# Patient Record
Sex: MALE | Race: OTHER | Employment: OTHER | URBAN - METROPOLITAN AREA
[De-identification: names, ages, dates, MRNs, and addresses within clinical notes are randomized per-mention and may not be internally consistent; named-entity substitution may affect disease eponyms.]

---

## 2017-12-15 NOTE — PATIENT DISCUSSION
Patient educated on neuroadaptation, sandoval/va, near focal point, Increase near lighting, and Symfony near goal OS.

## 2018-03-01 NOTE — PATIENT DISCUSSION
Reassured good distance and reading.  Patient needs to adapt more to Symfony at near and needs yag laser after Tax season.

## 2018-03-15 ENCOUNTER — ESTABLISHED PATIENT (OUTPATIENT)
Dept: URBAN - METROPOLITAN AREA CLINIC 36 | Facility: CLINIC | Age: 58
End: 2018-03-15

## 2018-03-15 DIAGNOSIS — H40.033: ICD-10-CM

## 2018-03-15 DIAGNOSIS — H25.813: ICD-10-CM

## 2018-03-15 PROCEDURE — 92014 COMPRE OPH EXAM EST PT 1/>: CPT

## 2018-03-15 ASSESSMENT — VISUAL ACUITY
OS_SC: 20/70-1
OD_PH: 20/50
OS_PH: 20/50-2
OD_SC: 20/70

## 2018-03-15 ASSESSMENT — TONOMETRY
OS_IOP_MMHG: 14
OD_IOP_MMHG: 16

## 2018-03-28 ENCOUNTER — ESTABLISHED PATIENT (OUTPATIENT)
Dept: URBAN - METROPOLITAN AREA CLINIC 36 | Facility: CLINIC | Age: 58
End: 2018-03-28

## 2018-03-28 DIAGNOSIS — H52.7: ICD-10-CM

## 2018-03-28 DIAGNOSIS — H53.8: ICD-10-CM

## 2018-03-28 PROCEDURE — 92015 DETERMINE REFRACTIVE STATE: CPT

## 2018-03-28 ASSESSMENT — VISUAL ACUITY
OS_CC: 20/30-2
OD_CC: J2
OD_CC: 20/25
OS_CC: J1

## 2018-03-30 ENCOUNTER — CONSULT (OUTPATIENT)
Dept: URBAN - METROPOLITAN AREA CLINIC 36 | Facility: CLINIC | Age: 58
End: 2018-03-30

## 2018-03-30 VITALS — DIASTOLIC BLOOD PRESSURE: 79 MMHG | HEART RATE: 81 BPM | HEIGHT: 60 IN | SYSTOLIC BLOOD PRESSURE: 120 MMHG

## 2018-03-30 DIAGNOSIS — H04.123: ICD-10-CM

## 2018-03-30 DIAGNOSIS — H25.813: ICD-10-CM

## 2018-03-30 DIAGNOSIS — H40.023: ICD-10-CM

## 2018-03-30 DIAGNOSIS — H40.033: ICD-10-CM

## 2018-03-30 PROCEDURE — 92020 GONIOSCOPY: CPT

## 2018-03-30 PROCEDURE — 76514 ECHO EXAM OF EYE THICKNESS: CPT

## 2018-03-30 PROCEDURE — 92014 COMPRE OPH EXAM EST PT 1/>: CPT

## 2018-03-30 PROCEDURE — 92132 CPTRZD OPH DX IMG ANT SGM: CPT

## 2018-03-30 RX ORDER — PREDNISOLONE ACETATE 10 MG/ML: 1 SUSPENSION/ DROPS OPHTHALMIC

## 2018-03-30 ASSESSMENT — PACHYMETRY
OD_CT_UM: 542
OS_CT_UM: 544

## 2018-03-30 ASSESSMENT — VISUAL ACUITY
OD_SC: 20/60-2
OD_PH: 20/30
OS_SC: 20/50
OS_PH: 20/30

## 2018-04-23 ENCOUNTER — SURGERY/PROCEDURE (OUTPATIENT)
Dept: URBAN - METROPOLITAN AREA SURGERY 14 | Facility: SURGERY | Age: 58
End: 2018-04-23

## 2018-04-23 DIAGNOSIS — H40.031: ICD-10-CM

## 2018-04-23 PROCEDURE — 66761 REVISION OF IRIS: CPT

## 2018-05-21 ENCOUNTER — SURGERY/PROCEDURE (OUTPATIENT)
Dept: URBAN - METROPOLITAN AREA SURGERY 14 | Facility: SURGERY | Age: 58
End: 2018-05-21

## 2018-05-21 DIAGNOSIS — H40.032: ICD-10-CM

## 2018-05-21 PROCEDURE — 66761 REVISION OF IRIS: CPT

## 2018-06-15 ENCOUNTER — DILATED FUNDUS EXAM (OUTPATIENT)
Dept: URBAN - METROPOLITAN AREA CLINIC 36 | Facility: CLINIC | Age: 58
End: 2018-06-15

## 2018-06-15 DIAGNOSIS — H40.023: ICD-10-CM

## 2018-06-15 DIAGNOSIS — H40.033: ICD-10-CM

## 2018-06-15 PROCEDURE — 92132 CPTRZD OPH DX IMG ANT SGM: CPT

## 2018-06-15 PROCEDURE — 92012 INTRM OPH EXAM EST PATIENT: CPT

## 2018-06-15 PROCEDURE — 9222550 BILAT EXTENDED OPHTHALMOSCOPY, FIRST

## 2018-06-15 PROCEDURE — 92020 GONIOSCOPY: CPT

## 2018-06-15 PROCEDURE — 92250 FUNDUS PHOTOGRAPHY W/I&R: CPT

## 2018-06-15 ASSESSMENT — VISUAL ACUITY
OS_SC: 20/50-1
OS_PH: 20/25-2
OD_PH: 20/25
OD_SC: 20/50-1

## 2018-06-15 ASSESSMENT — TONOMETRY
OD_IOP_MMHG: 14
OS_IOP_MMHG: 14

## 2018-07-11 NOTE — PROCEDURE NOTE: SURGICAL
Prior to commencing surgery patient identification, surgical procedure, site, and side were confirmed by Dr. Misha Gallagher. Following topical proparacaine anesthesia, the patient was positioned at the YAG laser, a contact lens coupled to the cornea with methylcellulose and an axial posterior capsulotomy performed without complication using 3.3 Mj x 29. Excess methylcellulose was washed from the eye, one drop of Alphagan was instilled and the patient returned to the holding area having tolerated the procedure well and without complication. <br />[id number]

## 2018-07-18 NOTE — PROCEDURE NOTE: SURGICAL
Prior to commencing surgery patient identification, surgical procedure, site, and side were confirmed by Dr. Thuy Delgado. Following topical proparacaine anesthesia, the patient was positioned at the YAG laser, a contact lens coupled to the cornea with methylcellulose and an axial posterior capsulotomy performed without complication using 3.0 Mj x 33. Excess methylcellulose was washed from the eye, one drop of Alphagan was instilled and the patient returned to the holding area having tolerated the procedure well and without complication. <br />[id number]

## 2018-12-14 ENCOUNTER — IOP CHECK (OUTPATIENT)
Dept: URBAN - METROPOLITAN AREA CLINIC 36 | Facility: CLINIC | Age: 58
End: 2018-12-14

## 2018-12-14 DIAGNOSIS — H40.033: ICD-10-CM

## 2018-12-14 DIAGNOSIS — H40.1131: ICD-10-CM

## 2018-12-14 PROCEDURE — 92133 CPTRZD OPH DX IMG PST SGM ON: CPT

## 2018-12-14 PROCEDURE — 92012 INTRM OPH EXAM EST PATIENT: CPT

## 2018-12-14 RX ORDER — TIMOLOL MALEATE 6.8 MG/ML: 1 SOLUTION OPHTHALMIC EVERY MORNING

## 2018-12-14 ASSESSMENT — TONOMETRY
OS_IOP_MMHG: 20
OD_IOP_MMHG: 22

## 2018-12-14 ASSESSMENT — VISUAL ACUITY
OS_SC: 20/20-1
OD_SC: 20/25

## 2019-01-11 ENCOUNTER — IOP CHECK (OUTPATIENT)
Dept: URBAN - METROPOLITAN AREA CLINIC 36 | Facility: CLINIC | Age: 59
End: 2019-01-11

## 2019-01-11 DIAGNOSIS — H40.033: ICD-10-CM

## 2019-01-11 DIAGNOSIS — H40.1131: ICD-10-CM

## 2019-01-11 PROCEDURE — 92012 INTRM OPH EXAM EST PATIENT: CPT

## 2019-01-11 PROCEDURE — 3284F IOP RED >=15% PRE-NTRV LVL: CPT

## 2019-01-11 PROCEDURE — 2027F OPTIC NERVE HEAD EVAL DONE: CPT

## 2019-01-11 PROCEDURE — G8427 DOCREV CUR MEDS BY ELIG CLIN: HCPCS

## 2019-01-11 PROCEDURE — 0517F GLAUCOMA PLAN OF CARE DOCD: CPT

## 2019-01-11 PROCEDURE — G9902 PT SCRN TBCO AND ID AS USER: HCPCS

## 2019-01-11 ASSESSMENT — TONOMETRY
OD_IOP_MMHG: 18
OS_IOP_MMHG: 16

## 2019-01-11 ASSESSMENT — VISUAL ACUITY
OD_CC: 20/25
OS_CC: 20/25-1

## 2019-02-12 NOTE — PATIENT DISCUSSION
Patient is happy with his vision at distance and near.  Patient states he doesn't need glasses for majority of daily activities except for small print while working.

## 2019-09-09 NOTE — PATIENT DISCUSSION
OD 1st. Sym OD: ben OS: TBD vs CV OD: ben OS: -1.75 to -2.00. Detail Level: Zone Detail Level: Detailed

## 2019-10-07 NOTE — PATIENT DISCUSSION
Called patient regarding Bumped appointment on 10/7 with Richelle. Requested call back to reschedule.        May need YAG Cap in future.

## 2023-09-28 ENCOUNTER — NEW PATIENT (OUTPATIENT)
Dept: URBAN - METROPOLITAN AREA CLINIC 36 | Facility: CLINIC | Age: 63
End: 2023-09-28

## 2023-09-28 DIAGNOSIS — H40.1131: ICD-10-CM

## 2023-09-28 DIAGNOSIS — H40.033: ICD-10-CM

## 2023-09-28 DIAGNOSIS — H25.813: ICD-10-CM

## 2023-09-28 DIAGNOSIS — H04.123: ICD-10-CM

## 2023-09-28 PROCEDURE — 92004 COMPRE OPH EXAM NEW PT 1/>: CPT

## 2023-09-28 PROCEDURE — 92015 DETERMINE REFRACTIVE STATE: CPT

## 2023-09-28 ASSESSMENT — VISUAL ACUITY
OS_CC: 20/20-2
OD_SC: J3
OD_CC: 20/20-1
OD_SC: 20/60-2
OS_SC: 20/60-1
OS_SC: J2

## 2023-09-28 ASSESSMENT — TONOMETRY
OS_IOP_MMHG: 17
OD_IOP_MMHG: 16

## 2024-01-03 ENCOUNTER — FOLLOW UP (OUTPATIENT)
Dept: URBAN - METROPOLITAN AREA CLINIC 36 | Facility: CLINIC | Age: 64
End: 2024-01-03

## 2024-01-03 DIAGNOSIS — H40.1131: ICD-10-CM

## 2024-01-03 PROCEDURE — 92083 EXTENDED VISUAL FIELD XM: CPT

## 2024-01-03 PROCEDURE — 92012 INTRM OPH EXAM EST PATIENT: CPT

## 2024-01-03 ASSESSMENT — VISUAL ACUITY
OD_CC: 20/20-1
OS_CC: 20/20-2

## 2024-01-03 ASSESSMENT — TONOMETRY
OS_IOP_MMHG: 17
OD_IOP_MMHG: 18

## 2024-05-01 NOTE — PATIENT DISCUSSION
Symptoms of Retinal tear and detachment reviewed. Patient understands to call immediately with any such symptoms. Yes

## 2025-07-11 ENCOUNTER — PREPPED CHART (OUTPATIENT)
Age: 65
End: 2025-07-11

## 2025-08-21 ENCOUNTER — CONSULTATION/EVALUATION (OUTPATIENT)
Age: 65
End: 2025-08-21

## 2025-08-21 DIAGNOSIS — H40.1131: ICD-10-CM

## 2025-08-21 PROCEDURE — 92250 FUNDUS PHOTOGRAPHY W/I&R: CPT

## 2025-08-21 PROCEDURE — 76514 ECHO EXAM OF EYE THICKNESS: CPT

## 2025-08-21 PROCEDURE — 92004 COMPRE OPH EXAM NEW PT 1/>: CPT
